# Patient Record
(demographics unavailable — no encounter records)

---

## 2017-04-07 NOTE — RAD
CT of the head without contrast, 4/7/2017:



History: Fall, head injury the ventricles are within normal limits in size.

There is no shift of the midline structures. There is no evidence of acute

intracranial hemorrhage or mass effect. The bone windows show no evidence of a

fracture.



IMPRESSION: No acute intracranial abnormality is detected.







CT of the facial bones without contrast, 4/7/2012:



Noncontrast scans were obtained with multiplanar reconstructions produced.



No acute fracture is identified. There is mucosal thickening in the

maxillary sinuses, more so on the right, presumably on an inflammatory basis.

The orbital contents are unremarkable. The patient is edentulous.



IMPRESSION: No acute bony abnormality is detected.











PQRS Compliance Statement:



One or more of the following individualized dose reduction techniques were

utilized for this examination:

1. Automated exposure control

2. Adjustment of the mA and/or kV according to patient size

3. Use of iterative reconstruction technique

## 2017-04-12 NOTE — ED.ADGEN
Past History


Past Medical History:  High Cholesterol, Hypertension


Past Surgical History:  No Surgical History


Smoking:  Non-smoker


Alcohol Use:  Occasionally


Drug Use:  None





Adult General


Chief Complaint


Chief Complaint


suture removal





HPI


HPI





Patient is a 57 year old male who presents to suture removal.  Laceration to 

face 5 days ago, no injuries.  Healing well





Review of Systems


Review of Systems





Constitutional: Denies fever or chills []





Allergies


Allergies





 Allergies








Coded Allergies Type Severity Reaction Last Updated Verified


 


  No Known Drug Allergies    12/19/13 No











Physical Exam


Physical Exam





Constitutional: Well developed, well nourished, no acute distress, non-toxic 

appearance. []


Face: well healing laceration just distal to the left nose, no surrounding 

erythema, + scabs.





EKG


EKG


[]





Radiology/Procedures


Radiology/Procedures


[]





Course & Med Decision Making


Course & Med Decision Making


Pertinent Labs and Imaging studies reviewed. (See chart for details)





sutures removed by me.  tolerated well.


Care instructions given





Final Impression


Final Impression


face laceration[]


Problems:  





Dragon Disclaimer


Dragon Disclaimer


This electronic medical record was generated, in whole or in part, using a 

voice recognition dictation system.








SCOT GARCIA MD Apr 12, 2017 15:06